# Patient Record
Sex: FEMALE
[De-identification: names, ages, dates, MRNs, and addresses within clinical notes are randomized per-mention and may not be internally consistent; named-entity substitution may affect disease eponyms.]

---

## 2019-09-18 ENCOUNTER — FORM ENCOUNTER (OUTPATIENT)
Age: 54
End: 2019-09-18

## 2020-11-09 ENCOUNTER — FORM ENCOUNTER (OUTPATIENT)
Age: 55
End: 2020-11-09

## 2020-11-17 ENCOUNTER — FORM ENCOUNTER (OUTPATIENT)
Age: 55
End: 2020-11-17

## 2020-12-23 ENCOUNTER — NON-APPOINTMENT (OUTPATIENT)
Age: 55
End: 2020-12-23

## 2020-12-23 PROBLEM — Z00.00 ENCOUNTER FOR PREVENTIVE HEALTH EXAMINATION: Status: ACTIVE | Noted: 2020-12-23

## 2021-01-20 ENCOUNTER — APPOINTMENT (OUTPATIENT)
Dept: BREAST CENTER | Facility: CLINIC | Age: 56
End: 2021-01-20

## 2021-02-04 ENCOUNTER — APPOINTMENT (OUTPATIENT)
Dept: BREAST CENTER | Facility: CLINIC | Age: 56
End: 2021-02-04

## 2021-02-12 ENCOUNTER — LABORATORY RESULT (OUTPATIENT)
Age: 56
End: 2021-02-12

## 2021-02-16 ENCOUNTER — RESULT REVIEW (OUTPATIENT)
Age: 56
End: 2021-02-16

## 2021-02-16 ENCOUNTER — APPOINTMENT (OUTPATIENT)
Dept: BREAST CENTER | Facility: AMBULATORY SURGERY CENTER | Age: 56
End: 2021-02-16
Payer: COMMERCIAL

## 2021-02-16 PROCEDURE — 76098 X-RAY EXAM SURGICAL SPECIMEN: CPT | Mod: 26

## 2021-02-16 PROCEDURE — 19301 PARTIAL MASTECTOMY: CPT | Mod: LT

## 2021-02-16 PROCEDURE — 14000 TIS TRNFR TRUNK 10 SQ CM/<: CPT | Mod: LT

## 2021-03-11 ENCOUNTER — APPOINTMENT (OUTPATIENT)
Dept: BREAST CENTER | Facility: CLINIC | Age: 56
End: 2021-03-11
Payer: COMMERCIAL

## 2021-03-11 PROCEDURE — 99024 POSTOP FOLLOW-UP VISIT: CPT

## 2021-03-30 ENCOUNTER — APPOINTMENT (OUTPATIENT)
Dept: RADIATION ONCOLOGY | Facility: CLINIC | Age: 56
End: 2021-03-30
Payer: COMMERCIAL

## 2021-03-30 VITALS
WEIGHT: 153 LBS | OXYGEN SATURATION: 99 % | TEMPERATURE: 98.2 F | RESPIRATION RATE: 16 BRPM | SYSTOLIC BLOOD PRESSURE: 137 MMHG | HEART RATE: 58 BPM | DIASTOLIC BLOOD PRESSURE: 84 MMHG

## 2021-03-30 PROCEDURE — 99072 ADDL SUPL MATRL&STAF TM PHE: CPT

## 2021-03-30 PROCEDURE — 99204 OFFICE O/P NEW MOD 45 MIN: CPT | Mod: 25

## 2021-03-30 NOTE — HISTORY OF PRESENT ILLNESS
[FreeTextEntry1] : Ms. Kiarra Robertson is a 55 year old female referred by Dr. 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 AJCC stage 0.\par \par Screening mammo/ultrasound done 9/24/2020 showed the following: \par -New cluster of calcifications seen in the LEFT medial superior breast, 4- 5 cmFN. BI-RADS 0.\par \par Diagnostic mammo done 10/22/2020 showed the following:\par -Pleomorphic calcifications in the upper inner left breast spanning 1.3 cm. No associated soft tissue density or mass is identified. No layering is seen on the lateral view. These are new from the prior study. Stereotactic biopsy is recommended. BI-RADS 4. \par \par Stereotactic biopsy of left breast (10:00- 11:00) calcifications done 11/10/2020 showed the following pathology:\par -DCIS, high nuclear grade, solid and papillary types, associated with calcifications and necrosis.\par \par On 2/16/21, she underwent left lumpectomy, which showed the following pathology:\par -Breast, left, excision: Ductal carcinoma in situ (DCIS), solid and cribriform type, with high nuclear grade and necrosis. Largest focus of DCIS measures 1 cm. Margins negative for DCIS, medial: 3 mm, inferior: 5 mm.\par -Background breast with atypical duct hyperplasia, atypical lobular hyperplasia, fibrocystic changes including usual duct\par hyperplasia and prior procedure site changes.\par -ER negative, AL negative. \par \par She followed up with Dr. Albino Crane on 3/11/21, at which time a hematoma was aspirated, and she was referred here for discussion of radiotherapy. She is scheduled to follow up with Dr. Albino Crane in September 2021.\par \par Today, she notes she feels well. Denies breast pain, edema, CP, SOB, fever, chills, fatigue, or any other complaints. Her LMP was 2/24/21. She denies history of radiation.\par \par She denies any known family history of cancer. \par \par Patient is a non-smoker, does not drink alcohol. She lives in the [place] by herself. She gets to medical appointments via public transportation. 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

## 2021-03-30 NOTE — PHYSICAL EXAM
[] : no respiratory distress [Respiration, Rhythm And Depth] : normal respiratory rhythm and effort [Auscultation Breath Sounds / Voice Sounds] : lungs were clear to auscultation bilaterally [Heart Rate And Rhythm] : heart rate and rhythm were normal [Heart Sounds] : normal S1 and S2 [Breast Abnormal Lactation (Galactorrhea)] : no nipple discharge [Breast Palpation Mass] : no palpable masses [No UE Edema] : there is no upper extremity edema [Bowel Sounds] : normal bowel sounds [Abdomen Soft] : soft [Nondistended] : nondistended [Cervical Lymph Nodes Enlarged Posterior Bilaterally] : posterior cervical [Abdomen Tenderness] : non-tender [Cervical Lymph Nodes Enlarged Anterior Bilaterally] : anterior cervical [Supraclavicular Lymph Nodes Enlarged Bilaterally] : supraclavicular [Axillary Lymph Nodes Enlarged Bilaterally] : axillary [Normal] : oriented to person, place and time, the affect was normal, the mood was normal and not anxious [de-identified] : Well healing incision to UIQ left breast. No tenderness upon palpation.  [de-identified] : Well healing incision to UIQ left breast. No tenderness upon palpation.

## 2021-03-30 NOTE — REVIEW OF SYSTEMS
[Patient Intake Form Reviewed] : Patient intake form was reviewed [Negative] : Allergic/Immunologic [de-identified] : s/p left lumpectomy.

## 2021-03-30 NOTE — VITALS
[Maximal Pain Intensity: 0/10] : 0/10 [Least Pain Intensity: 0/10] : 0/10 [90: Able to carry normal activity; minor signs or symptoms of disease.] : 90: Able to carry normal activity; minor signs or symptoms of disease.  [Date: ____________] : Patient's last distress assessment performed on [unfilled]. [0 - No Distress] : Distress Level: 0 [FreeTextEntry7] : I offered patient SW consult to discuss transportation concerns, which she accepted.

## 2021-03-30 NOTE — REASON FOR VISIT
[Consideration of Curative Therapy] : consideration of curative therapy for [Breast Cancer] : breast cancer [Other: _____] : [unfilled] [Patient Declined  Services] : - None: Patient declined  services [FreeTextEntry3] : Patient offered , but declined. She preferred for her friend Omid to interpret.

## 2021-04-02 ENCOUNTER — NON-APPOINTMENT (OUTPATIENT)
Age: 56
End: 2021-04-02

## 2021-04-08 ENCOUNTER — NON-APPOINTMENT (OUTPATIENT)
Age: 56
End: 2021-04-08

## 2021-04-21 ENCOUNTER — NON-APPOINTMENT (OUTPATIENT)
Age: 56
End: 2021-04-21

## 2021-04-22 NOTE — PHYSICAL EXAM
[Breast Palpation Mass] : no palpable masses [Breast Abnormal Lactation (Galactorrhea)] : no nipple discharge [No UE Edema] : there is no upper extremity edema [Bowel Sounds] : normal bowel sounds [Abdomen Soft] : soft [Nondistended] : nondistended [Abdomen Tenderness] : non-tender [Sclera] : the sclera and conjunctiva were normal [Extraocular Movements] : extraocular movements were intact [Outer Ear] : the ears and nose were normal in appearance [Hearing Threshold Finger Rub Not [place]] : hearing was normal [] : no respiratory distress [Respiration, Rhythm And Depth] : normal respiratory rhythm and effort [Normal] : oriented to person, place and time, the affect was normal, the mood was normal and not anxious [de-identified] : Well healed incision to UIQ left breast. No dermatitis. No tenderness upon palpation.  [de-identified] : Well healed incision to UIQ left breast. No dermatitis.

## 2021-04-22 NOTE — DISEASE MANAGEMENT
[Pathological] : TNM Stage: p [0] : 0 [TTNM] : is [NTNM] : 0 [MTNM] : 0 [de-identified] : 530 cGy [de-identified] : 5240 cGy [de-identified] : Left breast

## 2021-04-22 NOTE — REVIEW OF SYSTEMS
[Patient Intake Form Reviewed] : Patient intake form was reviewed [Negative] : Allergic/Immunologic [Fatigue: Grade 0] : Fatigue: Grade 0 [Dermatitis Radiation: Grade 0] : Dermatitis Radiation: Grade 0 [de-identified] : s/p left lumpectomy.

## 2021-04-22 NOTE — HISTORY OF PRESENT ILLNESS
[FreeTextEntry1] : 4/21/21- OTV- Ms. Robertson has completed 530 cGy/ 5240 cGy to the LEFT breast.  Today, she notes she feels good and treatment is going well thus far. Denies breast pain, skin irritation, or any other complaints.  continue RT as planned.\par \par \par ____________________\par INITIAL EVAL (3/30/21)\par Ms. Kiarra Robertson is a 55 year old female referred by Dr. 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 AJCC stage 0.\par \par Screening mammo/ultrasound done 9/24/2020 showed the following: \par -New cluster of calcifications seen in the LEFT medial superior breast, 4- 5 cmFN. BI-RADS 0.\par \par Diagnostic mammo done 10/22/2020 showed the following:\par -Pleomorphic calcifications in the upper inner left breast spanning 1.3 cm. No associated soft tissue density or mass is identified. No layering is seen on the lateral view. These are new from the prior study. Stereotactic biopsy is recommended. BI-RADS 4. \par \par Stereotactic biopsy of left breast (10:00- 11:00) calcifications done 11/10/2020 showed the following pathology:\par -DCIS, high nuclear grade, solid and papillary types, associated with calcifications and necrosis.\par \par On 2/16/21, she underwent left lumpectomy, which showed the following pathology:\par -Breast, left, excision: Ductal carcinoma in situ (DCIS), solid and cribriform type, with high nuclear grade and necrosis. Largest focus of DCIS measures 1 cm. Margins negative for DCIS, medial: 3 mm, inferior: 5 mm.\par -Background breast with atypical duct hyperplasia, atypical lobular hyperplasia, fibrocystic changes including usual duct\par hyperplasia and prior procedure site changes.\par -ER negative, KY negative. \par \par She followed up with Dr. Albino Crane on 3/11/21, at which time a hematoma was aspirated, and she was referred here for discussion of radiotherapy. She is scheduled to follow up with Dr. Albino Crane in September 2021.\par \par Today, she notes she feels well. Denies breast pain, edema, CP, SOB, fever, chills, fatigue, or any other complaints. Her LMP was 2/24/21. She denies history of radiation.\par \par She denies any known family history of cancer. \par \par Patient is a non-smoker, does not drink alcohol. She lives in the [place] by herself. She gets to medical appointments via public transportation. 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

## 2021-04-29 ENCOUNTER — NON-APPOINTMENT (OUTPATIENT)
Age: 56
End: 2021-04-29

## 2021-04-30 NOTE — HISTORY OF PRESENT ILLNESS
[FreeTextEntry1] : 4/29/21- OTV- Ms. Robertson has completed 1590 cGy/ 5240 cGy to the LEFT breast.  Today, she notes she is feeling quite well.  She has very mild hyperpigmentation along the left breast.  There is no erythema or skin breakdown.  She has good energy level.  will continue RT as planned.\par \par 4/21/21- OTV- Ms. Robertson has completed 530 cGy/ 5240 cGy to the LEFT breast.  Today, she notes she feels good and treatment is going well thus far. Denies breast pain, skin irritation, or any other complaints.  continue RT as planned.\par \par \par ____________________\par INITIAL EVAL (3/30/21)\par Ms. Kiarra Robertson is a 55 year old female referred by Dr. 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 AJCC stage 0.\par \par Screening mammo/ultrasound done 9/24/2020 showed the following: \par -New cluster of calcifications seen in the LEFT medial superior breast, 4- 5 cmFN. BI-RADS 0.\par \par Diagnostic mammo done 10/22/2020 showed the following:\par -Pleomorphic calcifications in the upper inner left breast spanning 1.3 cm. No associated soft tissue density or mass is identified. No layering is seen on the lateral view. These are new from the prior study. Stereotactic biopsy is recommended. BI-RADS 4. \par \par Stereotactic biopsy of left breast (10:00- 11:00) calcifications done 11/10/2020 showed the following pathology:\par -DCIS, high nuclear grade, solid and papillary types, associated with calcifications and necrosis.\par \par On 2/16/21, she underwent left lumpectomy, which showed the following pathology:\par -Breast, left, excision: Ductal carcinoma in situ (DCIS), solid and cribriform type, with high nuclear grade and necrosis. Largest focus of DCIS measures 1 cm. Margins negative for DCIS, medial: 3 mm, inferior: 5 mm.\par -Background breast with atypical duct hyperplasia, atypical lobular hyperplasia, fibrocystic changes including usual duct\par hyperplasia and prior procedure site changes.\par -ER negative, SD negative. \par \par She followed up with Dr. Albino Crane on 3/11/21, at which time a hematoma was aspirated, and she was referred here for discussion of radiotherapy. She is scheduled to follow up with Dr. Albino Crane in September 2021.\par \par Today, she notes she feels well. Denies breast pain, edema, CP, SOB, fever, chills, fatigue, or any other complaints. Her LMP was 2/24/21. She denies history of radiation.\par \par She denies any known family history of cancer. \par \par Patient is a non-smoker, does not drink alcohol. She lives in the [place] by herself. She gets to medical appointments via public transportation. 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

## 2021-04-30 NOTE — DISEASE MANAGEMENT
[Pathological] : TNM Stage: p [0] : 0 [TTNM] : is [NTNM] : 0 [MTNM] : 0 [de-identified] : 1590 cGy [de-identified] : 5240 cGy [de-identified] : Left breast

## 2021-04-30 NOTE — REVIEW OF SYSTEMS
[Fatigue: Grade 0] : Fatigue: Grade 0 [Dermatitis Radiation: Grade 0] : Dermatitis Radiation: Grade 0 [Patient Intake Form Reviewed] : Patient intake form was reviewed [Negative] : Allergic/Immunologic [de-identified] : s/p left lumpectomy.

## 2021-04-30 NOTE — PHYSICAL EXAM
[Sclera] : the sclera and conjunctiva were normal [Extraocular Movements] : extraocular movements were intact [Outer Ear] : the ears and nose were normal in appearance [Hearing Threshold Finger Rub Not [place]] : hearing was normal [] : no respiratory distress [Respiration, Rhythm And Depth] : normal respiratory rhythm and effort [Breast Palpation Mass] : no palpable masses [Breast Abnormal Lactation (Galactorrhea)] : no nipple discharge [No UE Edema] : there is no upper extremity edema [Bowel Sounds] : normal bowel sounds [Abdomen Soft] : soft [Nondistended] : nondistended [Abdomen Tenderness] : non-tender [Normal] : oriented to person, place and time, the affect was normal, the mood was normal and not anxious [de-identified] : Well healed incision to UIQ left breast. No dermatitis. No tenderness upon palpation.  [de-identified] : Well healed incision to UIQ left breast. No dermatitis.

## 2021-05-05 ENCOUNTER — NON-APPOINTMENT (OUTPATIENT)
Age: 56
End: 2021-05-05

## 2021-05-06 ENCOUNTER — NON-APPOINTMENT (OUTPATIENT)
Age: 56
End: 2021-05-06

## 2021-05-12 ENCOUNTER — NON-APPOINTMENT (OUTPATIENT)
Age: 56
End: 2021-05-12

## 2021-05-12 NOTE — HISTORY OF PRESENT ILLNESS
[FreeTextEntry1] : 5/6/21- OTV- Ms. Robertson has completed 2650 cGy/ 5240 cGy to the LEFT breast.  Today, she mild soreness in the breasts during her menstrual cycle. She otherwise feels well.  She denies skin irritation or breakdown. She is using Aquaphor.  continue RT as planned.\par \par 4/29/21- OTV- Ms. Robertson has completed 1590 cGy/ 5240 cGy to the LEFT breast.  Today, she notes she is feeling quite well.  She has very mild hyperpigmentation along the left breast.  There is no erythema or skin breakdown.  She has good energy level.  will continue RT as planned.\par \par 4/21/21- OTV- Ms. Robertson has completed 530 cGy/ 5240 cGy to the LEFT breast.  Today, she notes she feels good and treatment is going well thus far. Denies breast pain, skin irritation, or any other complaints.  continue RT as planned.\par \par \par ____________________\par INITIAL EVAL (3/30/21)\par Ms. Kiarra Robertson is a 55 year old female referred by Dr. 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 AJCC stage 0.\par \par Screening mammo/ultrasound done 9/24/2020 showed the following: \par -New cluster of calcifications seen in the LEFT medial superior breast, 4- 5 cmFN. BI-RADS 0.\par \par Diagnostic mammo done 10/22/2020 showed the following:\par -Pleomorphic calcifications in the upper inner left breast spanning 1.3 cm. No associated soft tissue density or mass is identified. No layering is seen on the lateral view. These are new from the prior study. Stereotactic biopsy is recommended. BI-RADS 4. \par \par Stereotactic biopsy of left breast (10:00- 11:00) calcifications done 11/10/2020 showed the following pathology:\par -DCIS, high nuclear grade, solid and papillary types, associated with calcifications and necrosis.\par \par On 2/16/21, she underwent left lumpectomy, which showed the following pathology:\par -Breast, left, excision: Ductal carcinoma in situ (DCIS), solid and cribriform type, with high nuclear grade and necrosis. Largest focus of DCIS measures 1 cm. Margins negative for DCIS, medial: 3 mm, inferior: 5 mm.\par -Background breast with atypical duct hyperplasia, atypical lobular hyperplasia, fibrocystic changes including usual duct\par hyperplasia and prior procedure site changes.\par -ER negative, KY negative. \par \par She followed up with Dr. Albino Crane on 3/11/21, at which time a hematoma was aspirated, and she was referred here for discussion of radiotherapy. She is scheduled to follow up with Dr. Albino Crane in September 2021.\par \par Today, she notes she feels well. Denies breast pain, edema, CP, SOB, fever, chills, fatigue, or any other complaints. Her LMP was 2/24/21. She denies history of radiation.\par \par She denies any known family history of cancer. \par \par Patient is a non-smoker, does not drink alcohol. She lives in the [place] by herself. She gets to medical appointments via public transportation. 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

## 2021-05-12 NOTE — DISEASE MANAGEMENT
[TTNM] : is [NTNM] : 0 [MTNM] : 0 [de-identified] : 2650 cGy [de-identified] : 5240 cGy [de-identified] : Left breast

## 2021-05-12 NOTE — REVIEW OF SYSTEMS
[Dermatitis Radiation: Grade 1 - Faint erythema or dry desquamation] : Dermatitis Radiation: Grade 1 - Faint erythema or dry desquamation [de-identified] : s/p left lumpectomy.

## 2021-05-12 NOTE — PHYSICAL EXAM
[Sclera] : the sclera and conjunctiva were normal [Extraocular Movements] : extraocular movements were intact [Outer Ear] : the ears and nose were normal in appearance [Hearing Threshold Finger Rub Not [place]] : hearing was normal [] : no respiratory distress [Respiration, Rhythm And Depth] : normal respiratory rhythm and effort [Heart Rate And Rhythm] : heart rate and rhythm were normal [Breast Palpation Mass] : no palpable masses [No UE Edema] : there is no upper extremity edema [Normal] : oriented to person, place and time, the affect was normal, the mood was normal and not anxious [de-identified] : Grade 1 dermatitis to left breast. Well healed incisions to left breast.  [de-identified] : Grade 1 dermatitis to left breast.

## 2021-05-19 ENCOUNTER — NON-APPOINTMENT (OUTPATIENT)
Age: 56
End: 2021-05-19

## 2021-05-19 NOTE — PHYSICAL EXAM
[Sclera] : the sclera and conjunctiva were normal [Extraocular Movements] : extraocular movements were intact [Outer Ear] : the ears and nose were normal in appearance [Hearing Threshold Finger Rub Not [place]] : hearing was normal [] : no respiratory distress [Respiration, Rhythm And Depth] : normal respiratory rhythm and effort [Heart Rate And Rhythm] : heart rate and rhythm were normal [Breast Palpation Mass] : no palpable masses [No UE Edema] : there is no upper extremity edema [Normal] : oriented to person, place and time, the affect was normal, the mood was normal and not anxious [de-identified] : Dry desquamation to left breast. Hyperpigmentation to left breast and axilla. Well healed incisions to left breast.  [de-identified] : Dry desquamation to left breast. Hyperpigmentation to left breast and axilla.

## 2021-05-19 NOTE — REVIEW OF SYSTEMS
[Fatigue: Grade 0] : Fatigue: Grade 0 [Dermatitis Radiation: Grade 1 - Faint erythema or dry desquamation] : Dermatitis Radiation: Grade 1 - Faint erythema or dry desquamation [Patient Intake Form Reviewed] : Patient intake form was reviewed [Negative] : Allergic/Immunologic [Breast Pain: Grade 1 - Mild pain] : Breast Pain: Grade 1 - Mild pain [Skin Hyperpigmentation: Grade 1 - Hyperpigmentation covering <10% BSA; no psychosocial impact] : Skin Hyperpigmentation: Grade 1 - Hyperpigmentation covering <10% BSA; no psychosocial impact [de-identified] : s/p left lumpectomy.

## 2021-05-19 NOTE — HISTORY OF PRESENT ILLNESS
[FreeTextEntry1] : 5/12/21- OTV- Ms. Robertson has completed 3710 cGy/ 5240 cGy to the LEFT breast.  Today, she notes she continues to have mild soreness to the breast. She is not taking anything for the pain. She also notes hyperpigmentation to left breast and axilla. She denies skin irritation. There is no desquamation.  She is using Aquaphor daily. will continue RT as planned.\par \par 5/6/21- OTV- Ms. Robertson has completed 2650 cGy/ 5240 cGy to the LEFT breast.  Today, she notes mild soreness in the breasts during her menstrual cycle. She otherwise feels well.  She denies skin irritation or breakdown. She is using Aquaphor.\par \par 4/29/21- OTV- Ms. Robertson has completed 1590 cGy/ 5240 cGy to the LEFT breast.  Today, she notes she is feeling quite well.  She has very mild hyperpigmentation along the left breast.  There is no erythema or skin breakdown.  She has good energy level.  will continue RT as planned.\par \par 4/21/21- OTV- Ms. Robertson has completed 530 cGy/ 5240 cGy to the LEFT breast.  Today, she notes she feels good and treatment is going well thus far. Denies breast pain, skin irritation, or any other complaints.  continue RT as planned.\par \par \par ____________________\par INITIAL EVAL (3/30/21)\par Ms. Kiarra Robertson is a 55 year old female referred by Dr. 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 AJCC stage 0.\par \par Screening mammo/ultrasound done 9/24/2020 showed the following: \par -New cluster of calcifications seen in the LEFT medial superior breast, 4- 5 cmFN. BI-RADS 0.\par \par Diagnostic mammo done 10/22/2020 showed the following:\par -Pleomorphic calcifications in the upper inner left breast spanning 1.3 cm. No associated soft tissue density or mass is identified. No layering is seen on the lateral view. These are new from the prior study. Stereotactic biopsy is recommended. BI-RADS 4. \par \par Stereotactic biopsy of left breast (10:00- 11:00) calcifications done 11/10/2020 showed the following pathology:\par -DCIS, high nuclear grade, solid and papillary types, associated with calcifications and necrosis.\par \par On 2/16/21, she underwent left lumpectomy, which showed the following pathology:\par -Breast, left, excision: Ductal carcinoma in situ (DCIS), solid and cribriform type, with high nuclear grade and necrosis. Largest focus of DCIS measures 1 cm. Margins negative for DCIS, medial: 3 mm, inferior: 5 mm.\par -Background breast with atypical duct hyperplasia, atypical lobular hyperplasia, fibrocystic changes including usual duct\par hyperplasia and prior procedure site changes.\par -ER negative, GA negative. \par \par She followed up with Dr. Albino Crane on 3/11/21, at which time a hematoma was aspirated, and she was referred here for discussion of radiotherapy. She is scheduled to follow up with Dr. Albino Crane in September 2021.\par \par Today, she notes she feels well. Denies breast pain, edema, CP, SOB, fever, chills, fatigue, or any other complaints. Her LMP was 2/24/21. She denies history of radiation.\par \par She denies any known family history of cancer. \par \par Patient is a non-smoker, does not drink alcohol. She lives in the [place] by herself. She gets to medical appointments via public transportation. 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

## 2021-05-19 NOTE — DISEASE MANAGEMENT
[Pathological] : TNM Stage: p [0] : 0 [TTNM] : is [NTNM] : 0 [MTNM] : 0 [de-identified] : 3710 cGy [de-identified] : 5240 cGy [de-identified] : Left breast

## 2021-05-20 ENCOUNTER — NON-APPOINTMENT (OUTPATIENT)
Age: 56
End: 2021-05-20

## 2021-05-20 VITALS
TEMPERATURE: 98.1 F | HEART RATE: 67 BPM | OXYGEN SATURATION: 99 % | RESPIRATION RATE: 16 BRPM | SYSTOLIC BLOOD PRESSURE: 138 MMHG | DIASTOLIC BLOOD PRESSURE: 83 MMHG | WEIGHT: 155.7 LBS

## 2021-05-31 NOTE — HISTORY OF PRESENT ILLNESS
Render Post-Care Instructions In Note?: yes
[FreeTextEntry1] : 5/20/21- OTV- Ms. Robertson has completed 5240 cGy/ 5240 cGy to the LEFT breast.  Today, she notes continues to have mild soreness to the breast. She is not taking anything for this. She also notes hyperpigmentation to left breast and axilla. She denies skin irritation. She is using Aquaphor daily.  there is no skin breakdown and, overall, she is doing well.  completes RT with us, with instructions for f/u provided.  in the interim, we will remain available should she have any questions or concerns.  \par \par 5/12/21- OTV- Ms. Robertson has completed 3710 cGy/ 5240 cGy to the LEFT breast.  Today, she notes she continues to have mild soreness to the breast. She is not taking anything for the pain. She also notes hyperpigmentation to left breast and axilla. She denies skin irritation. She is using Aquaphor daily. \par \par 5/6/21- OTV- Ms. Robertson has completed 2650 cGy/ 5240 cGy to the LEFT breast.  Today, she notes mild soreness in the breasts during her menstrual cycle. She otherwise feels well.  She denies skin irritation or breakdown. She is using Aquaphor.\par \par 4/29/21- OTV- Ms. Robertson has completed 1590 cGy/ 5240 cGy to the LEFT breast.  Today, she notes she is feeling quite well.  She has very mild hyperpigmentation along the left breast.  There is no erythema or skin breakdown.  She has good energy level.  will continue RT as planned.\par \par 4/21/21- OTV- Ms. Robertson has completed 530 cGy/ 5240 cGy to the LEFT breast.  Today, she notes she feels good and treatment is going well thus far. Denies breast pain, skin irritation, or any other complaints.  continue RT as planned.\par \par \par ____________________\par INITIAL EVAL (3/30/21)\par Ms. Kiarra Robertson is a 55 year old female referred by Dr. 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 AJCC stage 0.\par \par Screening mammo/ultrasound done 9/24/2020 showed the following: \par -New cluster of calcifications seen in the LEFT medial superior breast, 4- 5 cmFN. BI-RADS 0.\par \par Diagnostic mammo done 10/22/2020 showed the following:\par -Pleomorphic calcifications in the upper inner left breast spanning 1.3 cm. No associated soft tissue density or mass is identified. No layering is seen on the lateral view. These are new from the prior study. Stereotactic biopsy is recommended. BI-RADS 4. \par \par Stereotactic biopsy of left breast (10:00- 11:00) calcifications done 11/10/2020 showed the following pathology:\par -DCIS, high nuclear grade, solid and papillary types, associated with calcifications and necrosis.\par \par On 2/16/21, she underwent left lumpectomy, which showed the following pathology:\par -Breast, left, excision: Ductal carcinoma in situ (DCIS), solid and cribriform type, with high nuclear grade and necrosis. Largest focus of DCIS measures 1 cm. Margins negative for DCIS, medial: 3 mm, inferior: 5 mm.\par -Background breast with atypical duct hyperplasia, atypical lobular hyperplasia, fibrocystic changes including usual duct\par hyperplasia and prior procedure site changes.\par -ER negative, TN negative. \par \par She followed up with Dr. Albino Crane on 3/11/21, at which time a hematoma was aspirated, and she was referred here for discussion of radiotherapy. She is scheduled to follow up with Dr. Albino Crane in September 2021.\par \par Today, she notes she feels well. Denies breast pain, edema, CP, SOB, fever, chills, fatigue, or any other complaints. Her LMP was 2/24/21. She denies history of radiation.\par \par She denies any known family history of cancer. \par \par Patient is a non-smoker, does not drink alcohol. She lives in the [place] by herself. She gets to medical appointments via public transportation. 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  
Number Of Freeze-Thaw Cycles: 1 freeze-thaw cycle
Post-Care Instructions: I reviewed with the patient in detail post-care instructions. Patient is to wear sunprotection, and avoid picking at any of the treated lesions. Pt may apply Vaseline to crusted or scabbing areas.
Duration Of Freeze Thaw-Cycle (Seconds): 20
Detail Level: Detailed
Consent: The patient's consent was obtained including but not limited to risks of crusting, scabbing, blistering, scarring, darker or lighter pigmentary change, recurrence, incomplete removal and infection.

## 2021-05-31 NOTE — REVIEW OF SYSTEMS
[Skin Hyperpigmentation: Grade 1 - Hyperpigmentation covering <10% BSA; no psychosocial impact] : Skin Hyperpigmentation: Grade 1 - Hyperpigmentation covering <10% BSA; no psychosocial impact [Dermatitis Radiation: Grade 1 - Faint erythema or dry desquamation] : Dermatitis Radiation: Grade 1 - Faint erythema or dry desquamation [de-identified] : s/p left lumpectomy.

## 2021-05-31 NOTE — DISEASE MANAGEMENT
[TTNM] : is [NTNM] : 0 [de-identified] : 4990 cGy [MTNM] : 0 [de-identified] : 5240 cGy [de-identified] : Left breast

## 2021-05-31 NOTE — PHYSICAL EXAM
[Normal] : oriented to person, place and time, the affect was normal, the mood was normal and not anxious [Oriented To Time, Place, And Person] : oriented to person, place, and time [de-identified] : Left breast hyperpigmentation that leads into the axilla; +Redness, irritation to the site [de-identified] : See breast above

## 2021-07-28 ENCOUNTER — APPOINTMENT (OUTPATIENT)
Dept: RADIATION ONCOLOGY | Facility: CLINIC | Age: 56
End: 2021-07-28
Payer: COMMERCIAL

## 2021-08-16 PROBLEM — Z87.59 HISTORY OF ABORTION: Status: RESOLVED | Noted: 2020-12-23 | Resolved: 2021-08-16

## 2021-08-18 ENCOUNTER — APPOINTMENT (OUTPATIENT)
Dept: RADIATION ONCOLOGY | Facility: CLINIC | Age: 56
End: 2021-08-18
Payer: COMMERCIAL

## 2021-08-18 VITALS
DIASTOLIC BLOOD PRESSURE: 69 MMHG | TEMPERATURE: 98.4 F | WEIGHT: 158 LBS | OXYGEN SATURATION: 100 % | BODY MASS INDEX: 29.08 KG/M2 | HEART RATE: 80 BPM | SYSTOLIC BLOOD PRESSURE: 109 MMHG | HEIGHT: 62 IN

## 2021-08-18 PROCEDURE — 99024 POSTOP FOLLOW-UP VISIT: CPT | Mod: GC

## 2021-08-18 NOTE — DISEASE MANAGEMENT
[TTNM] : is [NTNM] : 0 [MTNM] : 0 [de-identified] : 5240cGy [de-identified] : 5240 cGy [de-identified] : Left breast

## 2021-08-18 NOTE — PHYSICAL EXAM
[Sclera] : the sclera and conjunctiva were normal [] : no respiratory distress [Exaggerated Use Of Accessory Muscles For Inspiration] : no accessory muscle use [Nondistended] : nondistended [Normal] : normal skin color and pigmentation and no rash [de-identified] : no erythema, mild residual hyperpigmentation, no edema, appropriate firmness and lumpectomy site

## 2021-08-18 NOTE — HISTORY OF PRESENT ILLNESS
[FreeTextEntry1] : INITIAL EVAL (3/30/21)\par Ms. 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 AJCC stage 0. She completed 5240 cGy of radiation therapy  on 5/20/21.\par \par 8/18/21- PTE- Today she presents for follow up and states she overall feels well, denies fatigue.  LEFT breast area skin mildly hyperpigmented, clean, dry & intact. Pt has mild residual tenderness in axilla and at surgical site and denies nipple discharge/. No upper extremity edema noted.  She overall notes that she feels well.  She is scheduled to meet with Dr. Kaur soon.\par \par Screening mammo/ultrasound done 9/24/2020 showed the following: \par -New cluster of calcifications seen in the LEFT medial superior breast, 4- 5 cmFN. BI-RADS 0.\par \par Diagnostic mammo done 10/22/2020 showed the following:\par -Pleomorphic calcifications in the upper inner left breast spanning 1.3 cm. No associated soft tissue density or mass is identified. No layering is seen on the lateral view. These are new from the prior study. Stereotactic biopsy is recommended. BI-RADS 4. \par \par Stereotactic biopsy of left breast (10:00- 11:00) calcifications done 11/10/2020 showed the following pathology:\par -DCIS, high nuclear grade, solid and papillary types, associated with calcifications and necrosis.\par \par On 2/16/21, she underwent left lumpectomy, which showed the following pathology:\par -Breast, left, excision: Ductal carcinoma in situ (DCIS), solid and cribriform type, with high nuclear grade and necrosis. Largest focus of DCIS measures 1 cm. Margins negative for DCIS, medial: 3 mm, inferior: 5 mm.\par -Background breast with atypical duct hyperplasia, atypical lobular hyperplasia, fibrocystic changes including usual duct\par hyperplasia and prior procedure site changes.\par -ER negative, MI negative. \par \par She followed up with Dr. Albino Crane on 3/11/21, at which time a hematoma was aspirated, and she was referred here for discussion of radiotherapy. She is scheduled to follow up with Dr. Albino Crane in September 2021.\par \par Today, she notes she feels well. Denies breast pain, edema, CP, SOB, fever, chills, fatigue, or any other complaints. Her LMP was 2/24/21. She denies history of radiation.\par \par She denies any known family history of cancer. \par \par Patient is a non-smoker, does not drink alcohol. She lives in the [place] by herself. She gets to medical appointments via public transportation. 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

## 2021-08-19 ENCOUNTER — APPOINTMENT (OUTPATIENT)
Dept: BREAST CENTER | Facility: CLINIC | Age: 56
End: 2021-08-19
Payer: COMMERCIAL

## 2021-08-19 VITALS
SYSTOLIC BLOOD PRESSURE: 134 MMHG | DIASTOLIC BLOOD PRESSURE: 86 MMHG | OXYGEN SATURATION: 97 % | BODY MASS INDEX: 27.23 KG/M2 | WEIGHT: 148 LBS | HEIGHT: 62 IN | HEART RATE: 56 BPM

## 2021-08-19 DIAGNOSIS — Z87.59 PERSONAL HISTORY OF OTHER COMPLICATIONS OF PREGNANCY, CHILDBIRTH AND THE PUERPERIUM: ICD-10-CM

## 2021-08-19 PROCEDURE — 99214 OFFICE O/P EST MOD 30 MIN: CPT

## 2022-02-09 ENCOUNTER — APPOINTMENT (OUTPATIENT)
Dept: RADIATION ONCOLOGY | Facility: CLINIC | Age: 57
End: 2022-02-09
Payer: COMMERCIAL

## 2022-02-09 VITALS
OXYGEN SATURATION: 100 % | TEMPERATURE: 98.4 F | BODY MASS INDEX: 29.63 KG/M2 | HEART RATE: 89 BPM | DIASTOLIC BLOOD PRESSURE: 79 MMHG | HEIGHT: 62 IN | WEIGHT: 161 LBS | SYSTOLIC BLOOD PRESSURE: 135 MMHG

## 2022-02-09 PROCEDURE — 99213 OFFICE O/P EST LOW 20 MIN: CPT

## 2022-02-09 RX ORDER — IBUPROFEN 400 MG/1
400 TABLET, FILM COATED ORAL
Qty: 30 | Refills: 0 | Status: DISCONTINUED | COMMUNITY
Start: 2021-02-06 | End: 2022-02-09

## 2022-02-09 NOTE — DISEASE MANAGEMENT
[Pathological] : TNM Stage: p [0] : 0 [TTNM] : is [NTNM] : 0 [MTNM] : 0 [de-identified] : 5240cGy [de-identified] : 5240 cGy [de-identified] : Left breast Ambulatory

## 2022-02-09 NOTE — HISTORY OF PRESENT ILLNESS
[FreeTextEntry1] : INITIAL EVAL (3/30/21)\par Ms. 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 AJCC stage 0. She completed 5240 cGy of radiation therapy  on 5/20/21.\par \par 02/09/2022- RPA-  ID# [id number] used. Today she presents for follow up and states she overall feels well, denies fatigue. The LEFT breast/axilla area shows no appreciable symptomatology related to her definitive radiation therapy, without appreciable hyperpigmentation, erythema, and desquamation. LEFT breast/axilla area skin is clean, dry & intact. Pt denies any breast pain, but c/o rare shooting sensations, does not feel the need to medicate for this, denies pain otherwise and denies nipple discharge. No upper extremity edema noted.  Follow up mammogram was done on 8/19/21 with no evidence of malignancy and next mammogram planed for Sept 2022 along with f/u with Dr. Albino Crane on 2/17/22.\par \par 8/18/21- PTE- Today she presents for follow up and states she overall feels well, denies fatigue.  LEFT breast area skin mildly hyperpigmented, clean, dry & intact. Pt has mild residual tenderness in axilla and at surgical site and denies nipple discharge/. No upper extremity edema noted.  She overall notes that she feels well.  She is scheduled to meet with Dr. Kaur soon.\par \par Screening mammo/ultrasound done 9/24/2020 showed the following: \par -New cluster of calcifications seen in the LEFT medial superior breast, 4- 5 cmFN. BI-RADS 0.\par \par Diagnostic mammo done 10/22/2020 showed the following:\par -Pleomorphic calcifications in the upper inner left breast spanning 1.3 cm. No associated soft tissue density or mass is identified. No layering is seen on the lateral view. These are new from the prior study. Stereotactic biopsy is recommended. BI-RADS 4. \par \par Stereotactic biopsy of left breast (10:00- 11:00) calcifications done 11/10/2020 showed the following pathology:\par -DCIS, high nuclear grade, solid and papillary types, associated with calcifications and necrosis.\par \par On 2/16/21, she underwent left lumpectomy, which showed the following pathology:\par -Breast, left, excision: Ductal carcinoma in situ (DCIS), solid and cribriform type, with high nuclear grade and necrosis. Largest focus of DCIS measures 1 cm. Margins negative for DCIS, medial: 3 mm, inferior: 5 mm.\par -Background breast with atypical duct hyperplasia, atypical lobular hyperplasia, fibrocystic changes including usual duct\par hyperplasia and prior procedure site changes.\par -ER negative, WY negative. \par \par She followed up with Dr. Albino Crane on 3/11/21, at which time a hematoma was aspirated, and she was referred here for discussion of radiotherapy. She is scheduled to follow up with Dr. Albino Crane in September 2021.\par \par Today, she notes she feels well. Denies breast pain, edema, CP, SOB, fever, chills, fatigue, or any other complaints. Her LMP was 2/24/21. She denies history of radiation.\par \par She denies any known family history of cancer. \par \par Patient is a non-smoker, does not drink alcohol. She lives in the [place] by herself. She gets to medical appointments via public transportation. 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

## 2022-02-09 NOTE — PHYSICAL EXAM
[Breast Palpation Mass] : no palpable masses [Breast Abnormal Lactation (Galactorrhea)] : no nipple discharge [No UE Edema] : there is no upper extremity edema [Normal] : no joint swelling, no clubbing or cyanosis of the fingernails and muscle strength and tone were normal [de-identified] : well healed, hina trade residual hyperpigmentation; no palpable abnormality along breasts.

## 2022-02-17 ENCOUNTER — NON-APPOINTMENT (OUTPATIENT)
Age: 57
End: 2022-02-17

## 2022-02-17 ENCOUNTER — APPOINTMENT (OUTPATIENT)
Dept: BREAST CENTER | Facility: CLINIC | Age: 57
End: 2022-02-17
Payer: COMMERCIAL

## 2022-02-17 VITALS
WEIGHT: 152 LBS | BODY MASS INDEX: 27.97 KG/M2 | SYSTOLIC BLOOD PRESSURE: 125 MMHG | DIASTOLIC BLOOD PRESSURE: 78 MMHG | HEIGHT: 62 IN | HEART RATE: 66 BPM

## 2022-02-17 PROCEDURE — 99213 OFFICE O/P EST LOW 20 MIN: CPT

## 2022-03-17 ENCOUNTER — APPOINTMENT (OUTPATIENT)
Dept: RADIATION ONCOLOGY | Facility: CLINIC | Age: 57
End: 2022-03-17
Payer: COMMERCIAL

## 2022-03-17 DIAGNOSIS — R60.9 EDEMA, UNSPECIFIED: ICD-10-CM

## 2022-03-17 PROCEDURE — 99213 OFFICE O/P EST LOW 20 MIN: CPT

## 2022-03-17 NOTE — DISEASE MANAGEMENT
[Pathological] : TNM Stage: p [0] : 0 [TTNM] : is [NTNM] : 0 [MTNM] : 0 [de-identified] : 5240cGy [de-identified] : 5240 cGy [de-identified] : Left breast

## 2022-03-17 NOTE — HISTORY OF PRESENT ILLNESS
[FreeTextEntry1] : INITIAL EVAL (3/30/21)\par Ms. 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 AJCC stage 0. She completed 5240 cGy of radiation therapy  on 5/20/21.\par \par 03/17/2022- RPA- Today she presents for follow up and states she overall feels well, denies fatigue. She states 4 days ago she self-palpated a linear firmness on her Left Axilla, which is only painful when she raises her arm and does not affect her ROM. Left Axilla examined and a linear firmness is appreciated.  The LEFT breast/axilla area shows no appreciable symptomatology related to her definitive radiation therapy, without appreciable hyperpigmentation, erythema, and desquamation. LEFT breast/axilla area skin is clean, dry & intact. Pt denies any breast pain and denies nipple discharge. No upper extremity edema noted. \par \par 02/09/2022- RPA-  ID# [id number] used. Today she presents for follow up and states she overall feels well, denies fatigue. The LEFT breast/axilla area shows no appreciable symptomatology related to her definitive radiation therapy, without appreciable hyperpigmentation, erythema, and desquamation. LEFT breast/axilla area skin is clean, dry & intact. Pt denies any breast pain, but c/o rare shooting sensations, does not feel the need to medicate for this, denies pain otherwise and denies nipple discharge. No upper extremity edema noted.  Follow up mammogram was done on 8/19/21 with no evidence of malignancy and next mammogram planed for Sept 2022 along with f/u with Dr. Albino Crane on 2/17/22.\par \par 8/18/21- PTE- Today she presents for follow up and states she overall feels well, denies fatigue.  LEFT breast area skin mildly hyperpigmented, clean, dry & intact. Pt has mild residual tenderness in axilla and at surgical site and denies nipple discharge/. No upper extremity edema noted.  She overall notes that she feels well.  She is scheduled to meet with Dr. Kaur soon.\par \par Screening mammo/ultrasound done 9/24/2020 showed the following: \par -New cluster of calcifications seen in the LEFT medial superior breast, 4- 5 cmFN. BI-RADS 0.\par \par Diagnostic mammo done 10/22/2020 showed the following:\par -Pleomorphic calcifications in the upper inner left breast spanning 1.3 cm. No associated soft tissue density or mass is identified. No layering is seen on the lateral view. These are new from the prior study. Stereotactic biopsy is recommended. BI-RADS 4. \par \par Stereotactic biopsy of left breast (10:00- 11:00) calcifications done 11/10/2020 showed the following pathology:\par -DCIS, high nuclear grade, solid and papillary types, associated with calcifications and necrosis.\par \par On 2/16/21, she underwent left lumpectomy, which showed the following pathology:\par -Breast, left, excision: Ductal carcinoma in situ (DCIS), solid and cribriform type, with high nuclear grade and necrosis. Largest focus of DCIS measures 1 cm. Margins negative for DCIS, medial: 3 mm, inferior: 5 mm.\par -Background breast with atypical duct hyperplasia, atypical lobular hyperplasia, fibrocystic changes including usual duct\par hyperplasia and prior procedure site changes.\par -ER negative, VT negative. \par \par She followed up with Dr. Albino Crane on 3/11/21, at which time a hematoma was aspirated, and she was referred here for discussion of radiotherapy. She is scheduled to follow up with Dr. Albino Crane in September 2021.\par \par Today, she notes she feels well. Denies breast pain, edema, CP, SOB, fever, chills, fatigue, or any other complaints. Her LMP was 2/24/21. She denies history of radiation.\par \par She denies any known family history of cancer. \par \par Patient is a non-smoker, does not drink alcohol. She lives in the [place] by herself. She gets to medical appointments via public transportation. 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

## 2022-03-17 NOTE — PHYSICAL EXAM
[] : no respiratory distress [Breast Palpation Mass] : no palpable masses [Breast Abnormal Lactation (Galactorrhea)] : no nipple discharge [No UE Edema] : there is no upper extremity edema [Normal] : oriented to person, place and time, the affect was normal, the mood was normal and not anxious [de-identified] : prominent-appearing vein along the left axilla appreciated; no palpable adenopathy

## 2022-09-01 ENCOUNTER — APPOINTMENT (OUTPATIENT)
Dept: BREAST CENTER | Facility: CLINIC | Age: 57
End: 2022-09-01

## 2022-09-01 ENCOUNTER — NON-APPOINTMENT (OUTPATIENT)
Age: 57
End: 2022-09-01

## 2022-09-01 VITALS
WEIGHT: 165 LBS | BODY MASS INDEX: 30.36 KG/M2 | SYSTOLIC BLOOD PRESSURE: 152 MMHG | HEART RATE: 75 BPM | DIASTOLIC BLOOD PRESSURE: 87 MMHG | HEIGHT: 62 IN

## 2022-09-01 DIAGNOSIS — Z85.3 PERSONAL HISTORY OF MALIGNANT NEOPLASM OF BREAST: ICD-10-CM

## 2022-09-01 DIAGNOSIS — Z86.000 PERSONAL HISTORY OF IN-SITU NEOPLASM OF BREAST: ICD-10-CM

## 2022-09-01 PROCEDURE — 99213 OFFICE O/P EST LOW 20 MIN: CPT

## 2022-09-22 ENCOUNTER — APPOINTMENT (OUTPATIENT)
Dept: RADIATION ONCOLOGY | Facility: CLINIC | Age: 57
End: 2022-09-22

## 2023-04-14 PROBLEM — R92.8 ABNORMAL FINDING ON BREAST IMAGING: Status: ACTIVE | Noted: 2023-04-14

## 2023-04-20 ENCOUNTER — APPOINTMENT (OUTPATIENT)
Dept: BREAST CENTER | Facility: CLINIC | Age: 58
End: 2023-04-20
Payer: COMMERCIAL

## 2023-04-20 VITALS — BODY MASS INDEX: 30.44 KG/M2 | HEIGHT: 61 IN | WEIGHT: 161.25 LBS

## 2023-04-20 VITALS — SYSTOLIC BLOOD PRESSURE: 142 MMHG | DIASTOLIC BLOOD PRESSURE: 86 MMHG | HEART RATE: 64 BPM

## 2023-04-20 DIAGNOSIS — Z78.9 OTHER SPECIFIED HEALTH STATUS: ICD-10-CM

## 2023-04-20 DIAGNOSIS — R92.8 OTHER ABNORMAL AND INCONCLUSIVE FINDINGS ON DIAGNOSTIC IMAGING OF BREAST: ICD-10-CM

## 2023-04-20 PROCEDURE — 99214 OFFICE O/P EST MOD 30 MIN: CPT

## 2023-04-20 NOTE — REASON FOR VISIT
[Follow-Up: _____] : a [unfilled] follow-up visit [Source: ______] : History obtained from [unfilled] [Interpreters_IDNumber] : [id number] [Interpreters_FullName] : Melinda

## 2023-04-20 NOTE — HISTORY OF PRESENT ILLNESS
[FreeTextEntry1] : Patient is a 58 yo Bulgarian speaking only F here for breast cancer surveillance. Pt c/o intermittent left breast pain since March 2023, which prompted L US in 3/2023. L MG confirmed stable post surgical changes 4/2023. Hx of  LEFT lumpx on 2/16/21 (age 55), surgical path revealed 1 cm DCIS, ER negative and negative margins. S/p RT. Denies fam hx of breast or ovarian cancer. Denies palpable masses or nipple discharge. \par \par TNM Staging: pTis, pNx, pMx\par \par 9/24/20: B/l MG & US- MG- dense, L- new cluster of calcs medial superior rec additional views, R- wnl; US- L- wnl, R- cysts, BIRADS 0\par 10/22/20: B/l MG- L- new pleomorphic calcs UIQ spanning 1.3 cm rec stereo, BIRADS 4\par 11/10/20: L stereo bx of calcs- high grade DCIS a/w calcs (ER/NE negative), concordant, trisha/cork clip.\par  2/16/21: Left nloc lumpx path- 1 cm DCIS, ER/NE negative, negative margins\par 8/19/21: B/l MG & US- L post lumpectomy spiculated architectural distortion 10:00. BRUCE. BI-RADS 2\par 9/1/22: B/l MG & US- heterogenously dense. US- R subcentimeter cysts. BI-RADS 2\par 3/15/23: L US- 5:00 postsurgical changes, No US abnormality in palpable area of concern. rec L MG for further eval. BIRADS 0.\par 4/20/23: L MG-heterogeneously dense, Stable post lumpectomy changes. BIRADS 2.

## 2023-04-20 NOTE — PAST MEDICAL HISTORY
[Menarche Age ____] : age at menarche was [unfilled] [Definite ___ (Date)] : the last menstrual period was [unfilled] [Irregular Cycle Intervals] : are  irregular [Total Preg ___] : G[unfilled] [Live Births ___] : P[unfilled]  [Abortions ___] : Abortions:[unfilled] [Age At Live Birth ___] : Age at live birth: [unfilled] [Postmenopausal] : The patient is postmenopausal [Menopause Age____] : age at menopause was [unfilled] [History of Hormone Replacement Treatment] : has no history of hormone replacement treatment [FreeTextEntry6] : no [FreeTextEntry7] : never [FreeTextEntry8] : yes

## 2023-04-20 NOTE — PHYSICAL EXAM
[Normocephalic] : normocephalic [EOMI] : extra ocular movement intact [Supple] : supple [No Supraclavicular Adenopathy] : no supraclavicular adenopathy [No Cervical Adenopathy] : no cervical adenopathy [de-identified] : R breast/axilla/supraclavicular area: No masses, discharge, or adenopathy\par  [de-identified] : L breast/axilla/supraclavicular area: No evidence of recurrence\par

## 2023-07-20 ENCOUNTER — APPOINTMENT (OUTPATIENT)
Dept: RADIATION ONCOLOGY | Facility: CLINIC | Age: 58
End: 2023-07-20

## 2023-08-03 NOTE — HISTORY OF PRESENT ILLNESS
[FreeTextEntry1] : INITIAL EVAL (3/30/21) Ms. 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 AJCC stage 0. She completed 5240 cGy of radiation therapy  on 5/20/21.  8/4/2023 - RPA - pt presents for follow up. Since prior visit, imaging studies noted to be stable; most recent L MG performed for c/o breast pain - heterogeneously dense, Stable post lumpectomy changes. BIRADS 2 (4/20/2023).  03/17/2022- RPA- Today she presents for follow up and states she overall feels well, denies fatigue. She states 4 days ago she self-palpated a linear firmness on her Left Axilla, which is only painful when she raises her arm and does not affect her ROM. Left Axilla examined and a linear firmness is appreciated.  The LEFT breast/axilla area shows no appreciable symptomatology related to her definitive radiation therapy, without appreciable hyperpigmentation, erythema, and desquamation. LEFT breast/axilla area skin is clean, dry & intact. Pt denies any breast pain and denies nipple discharge. No upper extremity edema noted.   02/09/2022- RPA-  ID# [id number] used. Today she presents for follow up and states she overall feels well, denies fatigue. The LEFT breast/axilla area shows no appreciable symptomatology related to her definitive radiation therapy, without appreciable hyperpigmentation, erythema, and desquamation. LEFT breast/axilla area skin is clean, dry & intact. Pt denies any breast pain, but c/o rare shooting sensations, does not feel the need to medicate for this, denies pain otherwise and denies nipple discharge. No upper extremity edema noted.  Follow up mammogram was done on 8/19/21 with no evidence of malignancy and next mammogram planed for Sept 2022 along with f/u with Dr. Albino Crane on 2/17/22.  8/18/21- PTE- Today she presents for follow up and states she overall feels well, denies fatigue.  LEFT breast area skin mildly hyperpigmented, clean, dry & intact. Pt has mild residual tenderness in axilla and at surgical site and denies nipple discharge/. No upper extremity edema noted.  She overall notes that she feels well.  She is scheduled to meet with Dr. Kaur soon.  Screening mammo/ultrasound done 9/24/2020 showed the following:  -New cluster of calcifications seen in the LEFT medial superior breast, 4- 5 cmFN. BI-RADS 0.  Diagnostic mammo done 10/22/2020 showed the following: -Pleomorphic calcifications in the upper inner left breast spanning 1.3 cm. No associated soft tissue density or mass is identified. No layering is seen on the lateral view. These are new from the prior study. Stereotactic biopsy is recommended. BI-RADS 4.   Stereotactic biopsy of left breast (10:00- 11:00) calcifications done 11/10/2020 showed the following pathology: -DCIS, high nuclear grade, solid and papillary types, associated with calcifications and necrosis.  On 2/16/21, she underwent left lumpectomy, which showed the following pathology: -Breast, left, excision: Ductal carcinoma in situ (DCIS), solid and cribriform type, with high nuclear grade and necrosis. Largest focus of DCIS measures 1 cm. Margins negative for DCIS, medial: 3 mm, inferior: 5 mm. -Background breast with atypical duct hyperplasia, atypical lobular hyperplasia, fibrocystic changes including usual duct hyperplasia and prior procedure site changes. -ER negative, WA negative.   She followed up with Dr. Albino Crane on 3/11/21, at which time a hematoma was aspirated, and she was referred here for discussion of radiotherapy. She is scheduled to follow up with Dr. Albino Crane in September 2021.  Today, she notes she feels well. Denies breast pain, edema, CP, SOB, fever, chills, fatigue, or any other complaints. Her LMP was 2/24/21. She denies history of radiation.  She denies any known family history of cancer.   Patient is a non-smoker, does not drink alcohol. She lives in the [place] by herself. She gets to medical appointments via public transportation. 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

## 2023-08-18 ENCOUNTER — APPOINTMENT (OUTPATIENT)
Dept: RADIATION ONCOLOGY | Facility: CLINIC | Age: 58
End: 2023-08-18

## 2023-09-07 ENCOUNTER — APPOINTMENT (OUTPATIENT)
Dept: BREAST CENTER | Facility: CLINIC | Age: 58
End: 2023-09-07
Payer: COMMERCIAL

## 2023-09-07 ENCOUNTER — NON-APPOINTMENT (OUTPATIENT)
Age: 58
End: 2023-09-07

## 2023-09-07 VITALS
BODY MASS INDEX: 30.99 KG/M2 | SYSTOLIC BLOOD PRESSURE: 131 MMHG | WEIGHT: 164 LBS | DIASTOLIC BLOOD PRESSURE: 79 MMHG | HEART RATE: 56 BPM

## 2023-09-07 DIAGNOSIS — Z78.9 OTHER SPECIFIED HEALTH STATUS: ICD-10-CM

## 2023-09-07 PROCEDURE — 99213 OFFICE O/P EST LOW 20 MIN: CPT

## 2023-09-07 RX ORDER — ELECTROLYTES/DEXTROSE
SOLUTION, ORAL ORAL
Refills: 0 | Status: DISCONTINUED | COMMUNITY
End: 2023-09-07

## 2023-09-07 RX ORDER — ASCORBIC ACID 500 MG
TABLET ORAL
Refills: 0 | Status: DISCONTINUED | COMMUNITY
End: 2023-09-07

## 2023-09-07 NOTE — HISTORY OF PRESENT ILLNESS
[FreeTextEntry1] : Patient is a 58yo [place]ralt[place] speaking only F here for breast cancer surveillance. Pt conitnues to c/o intermittent left breast pain since March 2023, which prompted L US/MG- confirmed stable post surgical changes 4/2023. Hx of LEFT lumpx on 2/16/21 (age 55) yielding 1 cm DCIS (ER/IL-) and negative margins. S/p RT (Dr. Sabillon). Denies fam hx of breast or ovarian cancer. Denies palpable masses or nipple discharge.   TNM Staging: pTis, pNx, pMx  9/24/20: B/l MG & US- MG- dense, L- new cluster of calcs medial superior rec additional views, R- wnl; US- L- wnl, R- cysts, BIRADS 0 10/22/20: B/l MG- L- new pleomorphic calcs UIQ spanning 1.3 cm rec stereo, BIRADS 4 11/10/20: L stereo bx of calcs- high grade DCIS a/w calcs (ER/IL negative), concordant, trisha/cork clip. 2/16/21: L nloc lumpx path- 1 cm DCIS, ER/IL negative, negative margins 8/19/21: B/l MG & US- L post lumpectomy spiculated architectural distortion 10:00. BRUCE. BI-RADS 2 9/1/22: B/l MG & US- heterogenously dense. US- R subcentimeter cysts. BI-RADS 2 3/15/23: L US- 5:00 postsurgical changes, No US abnormality in palpable area of concern. rec L MG for further eval. BIRADS 0. 4/20/23: L MG- heterogeneously dense, Stable post lumpectomy changes. BIRADS 2. 9/7/23: B/l MG & US- heterogeneously dense, L postsurgical arch distortion and skin thickening, BRUCE. BI-RADS 2.

## 2023-09-07 NOTE — PAST MEDICAL HISTORY
[History of Hormone Replacement Treatment] : has no history of hormone replacement treatment [FreeTextEntry6] : no [FreeTextEntry7] : never [FreeTextEntry8] : yes

## 2023-09-07 NOTE — PHYSICAL EXAM
[de-identified] : R breast/axilla/supraclavicular area: No masses, discharge, or adenopathy\par   [de-identified] : L breast/axilla/supraclavicular area: No evidence of recurrence\par

## 2023-09-22 ENCOUNTER — APPOINTMENT (OUTPATIENT)
Dept: BREAST CENTER | Facility: CLINIC | Age: 58
End: 2023-09-22

## 2024-02-13 NOTE — DISEASE MANAGEMENT
[TTNM] : is [NTNM] : 0 [MTNM] : 0 [de-identified] : 5240cGy [de-identified] : 5240 cGy [de-identified] : Left breast [Pathological] : TNM Stage: p [0] : 0

## 2024-02-13 NOTE — HISTORY OF PRESENT ILLNESS
[FreeTextEntry1] : INITIAL EVAL (3/30/21) Ms. 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 AJCC stage 0. She completed 5240 cGy of radiation therapy  on 5/20/21.  2/21/24: RPA MMGUS 9/7/23: BIRADS 2: Benign.   8/4/2023 - RPA - pt presents for follow up. Since prior visit, imaging studies noted to be stable; most recent L MG performed for c/o breast pain - heterogeneously dense, Stable post lumpectomy changes. BIRADS 2 (4/20/2023).  03/17/2022- RPA- Today she presents for follow up and states she overall feels well, denies fatigue. She states 4 days ago she self-palpated a linear firmness on her Left Axilla, which is only painful when she raises her arm and does not affect her ROM. Left Axilla examined and a linear firmness is appreciated.  The LEFT breast/axilla area shows no appreciable symptomatology related to her definitive radiation therapy, without appreciable hyperpigmentation, erythema, and desquamation. LEFT breast/axilla area skin is clean, dry & intact. Pt denies any breast pain and denies nipple discharge. No upper extremity edema noted.   02/09/2022- [facility]-  ID# [id number] used. Today she presents for follow up and states she overall feels well, denies fatigue. The LEFT breast/axilla area shows no appreciable symptomatology related to her definitive radiation therapy, without appreciable hyperpigmentation, erythema, and desquamation. LEFT breast/axilla area skin is clean, dry & intact. Pt denies any breast pain, but c/o rare shooting sensations, does not feel the need to medicate for this, denies pain otherwise and denies nipple discharge. No upper extremity edema noted.  Follow up mammogram was done on 8/19/21 with no evidence of malignancy and next mammogram planed for Sept 2022 along with f/u with Dr. Albino Crane on 2/17/22.  8/18/21- PTE- Today she presents for follow up and states she overall feels well, denies fatigue.  LEFT breast area skin mildly hyperpigmented, clean, dry & intact. Pt has mild residual tenderness in axilla and at surgical site and denies nipple discharge/. No upper extremity edema noted.  She overall notes that she feels well.  She is scheduled to meet with Dr. Kaur soon.  Screening mammo/ultrasound done 9/24/2020 showed the following:  -New cluster of calcifications seen in the LEFT medial superior breast, 4- 5 cmFN. BI-RADS 0.  Diagnostic mammo done 10/22/2020 showed the following: -Pleomorphic calcifications in the upper inner left breast spanning 1.3 cm. No associated soft tissue density or mass is identified. No layering is seen on the lateral view. These are new from the prior study. Stereotactic biopsy is recommended. BI-RADS 4.   Stereotactic biopsy of left breast (10:00- 11:00) calcifications done 11/10/2020 showed the following pathology: -DCIS, high nuclear grade, solid and papillary types, associated with calcifications and necrosis.  On 2/16/21, she underwent left lumpectomy, which showed the following pathology: -Breast, left, excision: Ductal carcinoma in situ (DCIS), solid and cribriform type, with high nuclear grade and necrosis. Largest focus of DCIS measures 1 cm. Margins negative for DCIS, medial: 3 mm, inferior: 5 mm. -Background breast with atypical duct hyperplasia, atypical lobular hyperplasia, fibrocystic changes including usual duct hyperplasia and prior procedure site changes. -ER negative, NV negative.   She followed up with Dr. Albino Crane on 3/11/21, at which time a hematoma was aspirated, and she was referred here for discussion of radiotherapy. She is scheduled to follow up with Dr. Albino Crane in September 2021.  Today, she notes she feels well. Denies breast pain, edema, CP, SOB, fever, chills, fatigue, or any other complaints. Her LMP was 2/24/21. She denies history of radiation.  She denies any known family history of cancer.   Patient is a non-smoker, does not drink alcohol. She lives in the [place] by herself. She gets to medical appointments via public transportation. 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

## 2024-02-13 NOTE — DISEASE MANAGEMENT
[TTNM] : is [NTNM] : 0 [MTNM] : 0 [de-identified] : 5240cGy [de-identified] : 5240 cGy [de-identified] : Left breast

## 2024-02-20 ENCOUNTER — APPOINTMENT (OUTPATIENT)
Dept: RADIATION ONCOLOGY | Facility: CLINIC | Age: 59
End: 2024-02-20

## 2024-04-18 ENCOUNTER — APPOINTMENT (OUTPATIENT)
Dept: BREAST CENTER | Facility: CLINIC | Age: 59
End: 2024-04-18
Payer: COMMERCIAL

## 2024-04-18 VITALS
HEART RATE: 75 BPM | WEIGHT: 162 LBS | DIASTOLIC BLOOD PRESSURE: 89 MMHG | HEIGHT: 61 IN | SYSTOLIC BLOOD PRESSURE: 149 MMHG | BODY MASS INDEX: 30.58 KG/M2

## 2024-04-18 DIAGNOSIS — Z85.3 ENCOUNTER FOR FOLLOW-UP EXAMINATION AFTER COMPLETED TREATMENT FOR MALIGNANT NEOPLASM: ICD-10-CM

## 2024-04-18 DIAGNOSIS — Z08 ENCOUNTER FOR FOLLOW-UP EXAMINATION AFTER COMPLETED TREATMENT FOR MALIGNANT NEOPLASM: ICD-10-CM

## 2024-04-18 PROCEDURE — 99214 OFFICE O/P EST MOD 30 MIN: CPT

## 2024-04-18 NOTE — HISTORY OF PRESENT ILLNESS
[FreeTextEntry1] : Patient is a 59yo German speaking  F here for breast cancer surveillance.  Patient complained of left breast swelling near previous biopsy surgery and r c/o intermittent left breast pain since March 2023, which prompted L US/MG- confirmed stable post surgical changes 4/2023.  Repeat imaging today showed no evidence of disease.  Hx of LEFT lumpx on 2/16/21 (age 55) yielding 1 cm DCIS (ER/PA-) and negative margins. S/p RT (Dr. Sabillon). Denies fam hx of breast or ovarian cancer. Denies palpable masses or nipple discharge.   TNM Staging: pTis, pNx, pMx  9/24/20: B/l MG & US- MG- dense, L- new cluster of calcs medial superior rec additional views, R- wnl; US- L- wnl, R- cysts, BIRADS 0 10/22/20: B/l MG- L- new pleomorphic calcs UIQ spanning 1.3 cm rec stereo, BIRADS 4 11/10/20: L stereo bx of calcs- high grade DCIS a/w calcs (ER/PA negative), concordant, trisha/cork clip. 2/16/21: L nloc lumpx path- 1 cm DCIS, ER/PA negative, negative margins 8/19/21: B/l MG & US- L post lumpectomy spiculated architectural distortion 10:00. BRUCE. BI-RADS 2 9/1/22: B/l MG & US- heterogenously dense. US- R subcentimeter cysts. BI-RADS 2 3/15/23: L US- 5:00 postsurgical changes, No US abnormality in palpable area of concern. rec L MG for further eval. BIRADS 0. 4/20/23: L MG- heterogeneously dense, Stable post lumpectomy changes. BIRADS 2. 9/7/23: B/l MG & US- heterogeneously dense, L postsurgical arch distortion and skin thickening, BRUCE. BI-RADS 2.  4/18/2024-left mammogram and ultrasound-stable postsurgical change

## 2024-04-18 NOTE — PHYSICAL EXAM
[Normocephalic] : normocephalic [EOMI] : extra ocular movement intact [Supple] : supple [No Supraclavicular Adenopathy] : no supraclavicular adenopathy [No Cervical Adenopathy] : no cervical adenopathy [de-identified] : R breast/axilla/supraclavicular area: No masses, discharge, or adenopathy\par   [de-identified] : L breast/axilla/supraclavicular area: No evidence of recurrence\par

## 2024-04-18 NOTE — PAST MEDICAL HISTORY
[Postmenopausal] : The patient is postmenopausal [Menarche Age ____] : age at menarche was [unfilled] [Menopause Age____] : age at menopause was [unfilled] [Definite ___ (Date)] : the last menstrual period was [unfilled] [Irregular Cycle Intervals] : are  irregular [Total Preg ___] : G[unfilled] [Live Births ___] : P[unfilled]  [Abortions ___] : Abortions:[unfilled] [Age At Live Birth ___] : Age at live birth: [unfilled] [History of Hormone Replacement Treatment] : has no history of hormone replacement treatment [FreeTextEntry6] : no [FreeTextEntry7] : never [FreeTextEntry8] : yes

## 2024-09-12 ENCOUNTER — NON-APPOINTMENT (OUTPATIENT)
Age: 59
End: 2024-09-12

## 2024-12-13 ENCOUNTER — APPOINTMENT (OUTPATIENT)
Dept: RADIATION ONCOLOGY | Facility: CLINIC | Age: 59
End: 2024-12-13
Payer: COMMERCIAL

## 2024-12-13 VITALS
TEMPERATURE: 98.4 F | HEART RATE: 74 BPM | DIASTOLIC BLOOD PRESSURE: 77 MMHG | SYSTOLIC BLOOD PRESSURE: 127 MMHG | RESPIRATION RATE: 16 BRPM | OXYGEN SATURATION: 100 %

## 2024-12-13 PROCEDURE — 99215 OFFICE O/P EST HI 40 MIN: CPT

## 2025-02-12 ENCOUNTER — NON-APPOINTMENT (OUTPATIENT)
Age: 60
End: 2025-02-12

## 2025-04-22 PROBLEM — R92.30 DENSE BREASTS: Status: ACTIVE | Noted: 2025-04-22

## 2025-04-24 ENCOUNTER — APPOINTMENT (OUTPATIENT)
Dept: BREAST CENTER | Facility: CLINIC | Age: 60
End: 2025-04-24

## 2025-04-24 DIAGNOSIS — R92.30 DENSE BREASTS, UNSPECIFIED: ICD-10-CM

## 2025-05-22 ENCOUNTER — NON-APPOINTMENT (OUTPATIENT)
Age: 60
End: 2025-05-22

## 2025-05-22 ENCOUNTER — APPOINTMENT (OUTPATIENT)
Dept: BREAST CENTER | Facility: CLINIC | Age: 60
End: 2025-05-22
Payer: COMMERCIAL

## 2025-05-22 VITALS
DIASTOLIC BLOOD PRESSURE: 79 MMHG | BODY MASS INDEX: 28.52 KG/M2 | WEIGHT: 155 LBS | SYSTOLIC BLOOD PRESSURE: 117 MMHG | HEART RATE: 70 BPM | HEIGHT: 62 IN

## 2025-05-22 DIAGNOSIS — Z85.3 ENCOUNTER FOR FOLLOW-UP EXAMINATION AFTER COMPLETED TREATMENT FOR MALIGNANT NEOPLASM: ICD-10-CM

## 2025-05-22 DIAGNOSIS — Z08 ENCOUNTER FOR FOLLOW-UP EXAMINATION AFTER COMPLETED TREATMENT FOR MALIGNANT NEOPLASM: ICD-10-CM

## 2025-05-22 PROCEDURE — 99213 OFFICE O/P EST LOW 20 MIN: CPT

## 2025-05-22 RX ORDER — MULTIVIT-MIN/IRON/FOLIC ACID/K 18-600-40
CAPSULE ORAL
Refills: 0 | Status: ACTIVE | COMMUNITY

## 2025-05-22 RX ORDER — MULTIVIT WITH MIN/ALA/HERBS 50 MG
TABLET ORAL
Refills: 0 | Status: ACTIVE | COMMUNITY

## 2025-05-22 RX ORDER — MULTIVITAMIN
TABLET ORAL
Refills: 0 | Status: ACTIVE | COMMUNITY

## 2025-06-26 ENCOUNTER — APPOINTMENT (OUTPATIENT)
Dept: BREAST CENTER | Facility: CLINIC | Age: 60
End: 2025-06-26